# Patient Record
Sex: MALE | Race: BLACK OR AFRICAN AMERICAN | URBAN - METROPOLITAN AREA
[De-identification: names, ages, dates, MRNs, and addresses within clinical notes are randomized per-mention and may not be internally consistent; named-entity substitution may affect disease eponyms.]

---

## 2020-07-25 ENCOUNTER — TELEPHONE ENCOUNTER (OUTPATIENT)
Dept: URBAN - METROPOLITAN AREA CLINIC 13 | Facility: CLINIC | Age: 35
End: 2020-07-25

## 2020-07-26 ENCOUNTER — TELEPHONE ENCOUNTER (OUTPATIENT)
Dept: URBAN - METROPOLITAN AREA CLINIC 13 | Facility: CLINIC | Age: 35
End: 2020-07-26

## 2022-12-28 ENCOUNTER — CLAIMS CREATED FROM THE CLAIM WINDOW (OUTPATIENT)
Dept: URBAN - METROPOLITAN AREA CLINIC 92 | Facility: CLINIC | Age: 37
End: 2022-12-28
Payer: COMMERCIAL

## 2022-12-28 ENCOUNTER — OFFICE VISIT (OUTPATIENT)
Dept: URBAN - METROPOLITAN AREA CLINIC 92 | Facility: CLINIC | Age: 37
End: 2022-12-28

## 2022-12-28 ENCOUNTER — WEB ENCOUNTER (OUTPATIENT)
Dept: URBAN - METROPOLITAN AREA CLINIC 92 | Facility: CLINIC | Age: 37
End: 2022-12-28

## 2022-12-28 ENCOUNTER — LAB OUTSIDE AN ENCOUNTER (OUTPATIENT)
Dept: URBAN - METROPOLITAN AREA CLINIC 92 | Facility: CLINIC | Age: 37
End: 2022-12-28

## 2022-12-28 VITALS
HEART RATE: 94 BPM | BODY MASS INDEX: 25.77 KG/M2 | TEMPERATURE: 96.8 F | HEIGHT: 69 IN | SYSTOLIC BLOOD PRESSURE: 134 MMHG | WEIGHT: 174 LBS | DIASTOLIC BLOOD PRESSURE: 81 MMHG

## 2022-12-28 DIAGNOSIS — K50.013 CROHN'S DISEASE OF SMALL INTESTINE WITH FISTULA: ICD-10-CM

## 2022-12-28 DIAGNOSIS — K61.0 PERIANAL ABSCESS: ICD-10-CM

## 2022-12-28 DIAGNOSIS — K50.913 PERIANAL FISTULA DUE TO CROHN'S DISEASE: ICD-10-CM

## 2022-12-28 PROCEDURE — 99204 OFFICE O/P NEW MOD 45 MIN: CPT

## 2022-12-28 PROCEDURE — 99204 OFFICE O/P NEW MOD 45 MIN: CPT | Performed by: INTERNAL MEDICINE

## 2022-12-28 NOTE — PHYSICAL EXAM GASTROINTESTINAL
Abdomen,  soft, nontender, nondistended,  no guarding or rigidity,  no masses palpable,  normal bowel sounds Liver and Spleen,no hepatosplenomegaly rectal Chaperone present one fistula noted on left glute and one fistula that was actively draining purulent discharge noted on left glute this area was also indurated

## 2022-12-28 NOTE — HPI-TODAY'S VISIT:
37-year-old male presents today for Crohn's disease flare.  He was first diagnosed with Crohn's in 1996 when he was 10 years old.  It is noted patient had his ileum removed in 1999 and his last colonoscopy we have was in 2007 which noted external hemorrhoids, perianal abscess versus fistula, ileocolonic anastomosis and active Crohn's in the TI.   More recently he states he went to [facility] due to increased rectal irritation, pain and draining of fisulta. He did get a CT scan while there and was told there is no abscess yet but there was a fistula. He was given prednisone 40 or 20 mg which he took for 5 days and he states the swelling went down.  He states he looked at his anal area and noted the 3 tracks.  This started in the beginning of November and has been getting progressively worse.  On December 22 one of these track started to drain and he started having increasing anal pain.  He has had issues with fistulas and abscesses in the past but states he was doing fine for about 18 months while he was incarcerated since 2020. He has also had mutilple procedures for fisulta and abcesses and did have a steton placement years ago.  He is currently living in a [facility] house and is awaiting parole in January.   He states he does not know when he is last had a EGD or colonoscopy.  He states there has been 2 occurrences where he has received Remicade infusions.  One was in 2008 and another was more recently in 2017.  After the 2017 infusion he states he started having joint pain and attributed this to the infusion so he never went back.  He denies any current abdominal pain.  He was having 4-5 bowel movements daily that were mainly formed until today where he started noticing diarrhea.  He denies any melena, hematochezia, weight loss.  He denies any upper GI complaints. No fam with crohns, UC, colon CA or colon polyps.

## 2022-12-28 NOTE — HPI-TODAY'S VISIT:
37-year-old male presents today for Crohn's disease.  Chart review he was first diagnosed with Crohn's in 1996.  He did have surgery on  and his ileum was removed  Colon 2007: EH, perianal abscess vs fistula, ileocolonic anastomosis, active Crohn's in TI   Upper GI small bowl follow through 1999: changes of progressive Crohn's disease involving TI and long loop of distal ileum uncomplicated.

## 2022-12-30 LAB
A/G RATIO: 1.6
ABSOLUTE BASOPHILS: 54
ABSOLUTE EOSINOPHILS: 54
ABSOLUTE LYMPHOCYTES: 1571
ABSOLUTE MONOCYTES: 670
ABSOLUTE NEUTROPHILS: 5352
ALBUMIN: 4.6
ALKALINE PHOSPHATASE: 79
ALT (SGPT): 19
AST (SGOT): 23
BASOPHILS: 0.7
BILIRUBIN, TOTAL: 0.5
BUN/CREATININE RATIO: (no result)
BUN: 14
C-REACTIVE PROTEIN, QUANT: 4.6
CALCIUM: 9.6
CARBON DIOXIDE, TOTAL: 26
CHLORIDE: 106
CREATININE: 1.18
EGFR: 82
EOSINOPHILS: 0.7
GLOBULIN, TOTAL: 2.8
GLUCOSE: 89
HEMATOCRIT: 38.8
HEMOGLOBIN: 14.1
HEPATITIS B CORE AB TOTAL: (no result)
HEPATITIS B SURFACE AB IMMUNITY, QN: <5
HEPATITIS B SURFACE ANTIGEN: (no result)
LYMPHOCYTES: 20.4
MCH: 30.2
MCHC: 36.3
MCV: 83.1
MITOGEN-NIL: >10
MONOCYTES: 8.7
MPV: 12.4
NEUTROPHILS: 69.5
PLATELET COUNT: 271
POTASSIUM: 4.3
PROTEIN, TOTAL: 7.4
QUANTIFERON NIL VALUE: 0.04
QUANTIFERON TB1 AG VALUE: 0.01
QUANTIFERON TB2 AG VALUE: 0
QUANTIFERON-TB GOLD PLUS: NEGATIVE
RDW: 12.6
RED BLOOD CELL COUNT: 4.67
SODIUM: 141
WHITE BLOOD CELL COUNT: 7.7

## 2023-01-03 ENCOUNTER — TELEPHONE ENCOUNTER (OUTPATIENT)
Dept: URBAN - METROPOLITAN AREA CLINIC 92 | Facility: CLINIC | Age: 38
End: 2023-01-03

## 2023-02-10 ENCOUNTER — OFFICE VISIT (OUTPATIENT)
Dept: URBAN - METROPOLITAN AREA SURGERY CENTER 16 | Facility: SURGERY CENTER | Age: 38
End: 2023-02-10

## 2023-03-02 PROBLEM — 733533008: Status: ACTIVE | Noted: 2022-12-28

## 2023-03-02 PROBLEM — 56689002: Status: ACTIVE | Noted: 2022-12-27

## 2023-03-03 ENCOUNTER — OFFICE VISIT (OUTPATIENT)
Dept: URBAN - METROPOLITAN AREA CLINIC 92 | Facility: CLINIC | Age: 38
End: 2023-03-03

## 2023-03-03 NOTE — HPI-TODAY'S VISIT:
37-year-old male presents today for Crohn's disease flare.  He was first diagnosed with Crohn's in 1996 when he was 10 years old.  It is noted patient had his ileum removed in 1999 and his last colonoscopy we have was in 2007 which noted external hemorrhoids, perianal abscess versus fistula, ileocolonic anastomosis and active Crohn's in the TI.   More recently he states he went to [facility] due to increased rectal irritation, pain and draining of fisulta. He did get a CT scan while there and was told there is no abscess yet but there was a fistula. He was given prednisone 40 or 20 mg which he took for 5 days and he states the swelling went down.  He states he looked at his anal area and noted the 3 tracks.  This started in the beginning of November and has been getting progressively worse.  On December 22 one of these track started to drain and he started having increasing anal pain.  He has had issues with fistulas and abscesses in the past but states he was doing fine for about 18 months while he was incarcerated since 2020. He has also had mutilple procedures for fisulta and abcesses and did have a steton placement years ago.  He is currently living in a group home house and is awaiting parole in January.   He states he does not know when he is last had a EGD or colonoscopy.  He states there has been 2 occurrences where he has received Remicade infusions.  One was in 2008 and another was more recently in 2017.  After the 2017 infusion he states he started having joint pain and attributed this to the infusion so he never went back.  He denies any current abdominal pain.  He was having 4-5 bowel movements daily that were mainly formed until today where he started noticing diarrhea.  He denies any melena, hematochezia, weight loss.  He denies any upper GI complaints. No fam with crohns, UC, colon CA or colon polyps.

## 2023-04-10 ENCOUNTER — TELEPHONE ENCOUNTER (OUTPATIENT)
Dept: URBAN - METROPOLITAN AREA CLINIC 92 | Facility: CLINIC | Age: 38
End: 2023-04-10

## 2023-04-11 ENCOUNTER — DASHBOARD ENCOUNTERS (OUTPATIENT)
Age: 38
End: 2023-04-11

## 2023-04-12 ENCOUNTER — OFFICE VISIT (OUTPATIENT)
Dept: URBAN - METROPOLITAN AREA CLINIC 92 | Facility: CLINIC | Age: 38
End: 2023-04-12
Payer: COMMERCIAL

## 2023-04-12 ENCOUNTER — TELEPHONE ENCOUNTER (OUTPATIENT)
Dept: URBAN - METROPOLITAN AREA CLINIC 92 | Facility: CLINIC | Age: 38
End: 2023-04-12

## 2023-04-12 VITALS — WEIGHT: 165 LBS | HEIGHT: 69 IN | BODY MASS INDEX: 24.44 KG/M2

## 2023-04-12 DIAGNOSIS — K50.013 CROHN'S DISEASE OF SMALL INTESTINE WITH FISTULA: ICD-10-CM

## 2023-04-12 DIAGNOSIS — K50.913 PERIANAL FISTULA DUE TO CROHN'S DISEASE: ICD-10-CM

## 2023-04-12 DIAGNOSIS — K61.0 PERIANAL ABSCESS: ICD-10-CM

## 2023-04-12 PROCEDURE — 99214 OFFICE O/P EST MOD 30 MIN: CPT

## 2023-04-12 RX ORDER — INFLIXIMAB 100 MG/10ML
AS DIRECTED INJECTION, POWDER, LYOPHILIZED, FOR SOLUTION INTRAVENOUS
Qty: 100 | Refills: 0 | OUTPATIENT
Start: 2023-04-12 | End: 2023-05-12

## 2023-04-12 NOTE — HPI-TODAY'S VISIT:
12/2022 37-year-old male presents today for Crohn's disease flare.  He was first diagnosed with Crohn's in 1996 when he was 10 years old.  It is noted patient had his ileum removed in 1999 and his last colonoscopy we have was in 2007 which noted external hemorrhoids, perianal abscess versus fistula, ileocolonic anastomosis and active Crohn's in the TI.   More recently he states he went to [facility] due to increased rectal irritation, pain and draining of fisulta. He did get a CT scan while there and was told there is no abscess yet but there was a fistula. He was given prednisone 40 or 20 mg which he took for 5 days and he states the swelling went down.  He states he looked at his anal area and noted the 3 tracks.  This started in the beginning of November and has been getting progressively worse.  On December 22 one of these track started to drain and he started having increasing anal pain.  He has had issues with fistulas and abscesses in the past but states he was doing fine for about 18 months while he was incarcerated since 2020. He has also had mutilple procedures for fisulta and abcesses and did have a steton placement years ago.  He is currently living in a correction house and is awaiting parole in January.   He states he does not know when he is last had a EGD or colonoscopy.  He states there has been 2 occurrences where he has received Remicade infusions.  One was in 2008 and another was more recently in 2017.  After the 2017 infusion he states he started having joint pain and attributed this to the infusion so he never went back.  He denies any current abdominal pain.  He was having 4-5 bowel movements daily that were mainly formed until today where he started noticing diarrhea.  He denies any melena, hematochezia, weight loss.  He denies any upper GI complaints. No fam with crohns, UC, colon CA or colon polyps.  4/11/23 Since last visit patient has been seeing Dr. Inocencio Steen for his perianal fistula and abscess.  He did have surgery with him on 1/27. He felt better after but never got to the point where his fistula healed.  Recently he had an pelvic MRI that demonstrated right transsphincteric perianal fistula which appears active, multiple additional chronic bilateral branching.  No fistulous tracts.  He was told by Dr. Steen to contact us to get on maintenance therapy for his Crohn's symptoms his fistula would be difficult to operate on as it is invading the sphincter. He is currently on Cipro and Flagyl for his fistulas. He has been having diarrhea and has about 4-5 BM daily with no hematochezia, melena. He states he has lost about 10 pounds since we last saw him. He has no abd pain, fevers, chills, n.v, upper gi complaints.  We were able to obtain his ER visit to [facility] and CT  demonstrated right perianal fistula likely secondary to Crohn's disease given postsurgical change with ileocolonic anastomosis otherwise negative CT Patient's lab at last visit demonstrated negative TB, nonimmune to hepatitis B, normal CRP, fecal calprotectin not done, normal CMP and CBC

## 2023-05-11 ENCOUNTER — OFFICE VISIT (OUTPATIENT)
Dept: URBAN - METROPOLITAN AREA CLINIC 91 | Facility: CLINIC | Age: 38
End: 2023-05-11
Payer: COMMERCIAL

## 2023-05-11 ENCOUNTER — TELEPHONE ENCOUNTER (OUTPATIENT)
Dept: URBAN - METROPOLITAN AREA CLINIC 91 | Facility: CLINIC | Age: 38
End: 2023-05-11

## 2023-05-11 VITALS
RESPIRATION RATE: 18 BRPM | TEMPERATURE: 98.1 F | HEART RATE: 74 BPM | WEIGHT: 172.8 LBS | HEIGHT: 69 IN | DIASTOLIC BLOOD PRESSURE: 96 MMHG | SYSTOLIC BLOOD PRESSURE: 161 MMHG | BODY MASS INDEX: 25.59 KG/M2

## 2023-05-11 DIAGNOSIS — K50.80 CROHN'S COLITIS: ICD-10-CM

## 2023-05-11 PROCEDURE — 96413 CHEMO IV INFUSION 1 HR: CPT | Performed by: INTERNAL MEDICINE

## 2023-05-11 PROCEDURE — 96415 CHEMO IV INFUSION ADDL HR: CPT | Performed by: INTERNAL MEDICINE

## 2023-05-11 RX ORDER — INFLIXIMAB 100 MG/10ML
AS DIRECTED INJECTION, POWDER, LYOPHILIZED, FOR SOLUTION INTRAVENOUS
Qty: 100 | Refills: 0 | Status: ACTIVE | COMMUNITY
Start: 2023-04-12 | End: 2023-05-12

## 2023-05-22 ENCOUNTER — OFFICE VISIT (OUTPATIENT)
Dept: URBAN - METROPOLITAN AREA CLINIC 91 | Facility: CLINIC | Age: 38
End: 2023-05-22

## 2023-05-25 ENCOUNTER — OFFICE VISIT (OUTPATIENT)
Dept: URBAN - METROPOLITAN AREA SURGERY CENTER 16 | Facility: SURGERY CENTER | Age: 38
End: 2023-05-25

## 2023-06-12 ENCOUNTER — TELEPHONE ENCOUNTER (OUTPATIENT)
Dept: URBAN - METROPOLITAN AREA CLINIC 97 | Facility: CLINIC | Age: 38
End: 2023-06-12

## 2023-06-19 ENCOUNTER — OFFICE VISIT (OUTPATIENT)
Dept: URBAN - METROPOLITAN AREA CLINIC 91 | Facility: CLINIC | Age: 38
End: 2023-06-19

## 2024-03-07 ENCOUNTER — OV EP (OUTPATIENT)
Dept: URBAN - METROPOLITAN AREA CLINIC 92 | Facility: CLINIC | Age: 39
End: 2024-03-07

## 2024-03-15 ENCOUNTER — OV EP (OUTPATIENT)
Dept: URBAN - METROPOLITAN AREA CLINIC 92 | Facility: CLINIC | Age: 39
End: 2024-03-15

## 2024-03-15 NOTE — HPI-TODAY'S VISIT:
12/2022 37-year-old male presents today for Crohn's disease flare.  He was first diagnosed with Crohn's in 1996 when he was 10 years old.  It is noted patient had his ileum removed in 1999 and his last colonoscopy we have was in 2007 which noted external hemorrhoids, perianal abscess versus fistula, ileocolonic anastomosis and active Crohn's in the TI.   More recently he states he went to [facility] due to increased rectal irritation, pain and draining of fisulta. He did get a CT scan while there and was told there is no abscess yet but there was a fistula. He was given prednisone 40 or 20 mg which he took for 5 days and he states the swelling went down.  He states he looked at his anal area and noted the 3 tracks.  This started in the beginning of November and has been getting progressively worse.  On December 22 one of these track started to drain and he started having increasing anal pain.  He has had issues with fistulas and abscesses in the past but states he was doing fine for about 18 months while he was incarcerated since 2020. He has also had mutilple procedures for fisulta and abcesses and did have a steton placement years ago.  He is currently living in a jail house and is awaiting parole in January.   He states he does not know when he is last had a EGD or colonoscopy.  He states there has been 2 occurrences where he has received Remicade infusions.  One was in 2008 and another was more recently in 2017.  After the 2017 infusion he states he started having joint pain and attributed this to the infusion so he never went back.  He denies any current abdominal pain.  He was having 4-5 bowel movements daily that were mainly formed until today where he started noticing diarrhea.  He denies any melena, hematochezia, weight loss.  He denies any upper GI complaints. No fam with crohns, UC, colon CA or colon polyps.  4/11/23 Since last visit patient has been seeing Dr. Inocencio Steen for his perianal fistula and abscess.  He did have surgery with him on 1/27. He felt better after but never got to the point where his fistula healed.  Recently he had an pelvic MRI that demonstrated right transsphincteric perianal fistula which appears active, multiple additional chronic bilateral branching.  No fistulous tracts.  He was told by Dr. Steen to contact us to get on maintenance therapy for his Crohn's symptoms his fistula would be difficult to operate on as it is invading the sphincter. He is currently on Cipro and Flagyl for his fistulas. He has been having diarrhea and has about 4-5 BM daily with no hematochezia, melena. He states he has lost about 10 pounds since we last saw him. He has no abd pain, fevers, chills, n.v, upper gi complaints.  We were able to obtain his ER visit to [facility] and CT  demonstrated right perianal fistula likely secondary to Crohn's disease given postsurgical change with ileocolonic anastomosis otherwise negative CT Patient's lab at last visit demonstrated negative TB, nonimmune to hepatitis B, normal CRP, fecal calprotectin not done, normal CMP and CBC